# Patient Record
Sex: FEMALE | Race: ASIAN | NOT HISPANIC OR LATINO | Employment: FULL TIME | URBAN - METROPOLITAN AREA
[De-identification: names, ages, dates, MRNs, and addresses within clinical notes are randomized per-mention and may not be internally consistent; named-entity substitution may affect disease eponyms.]

---

## 2024-04-08 ENCOUNTER — OFFICE VISIT (OUTPATIENT)
Dept: INTERNAL MEDICINE CLINIC | Facility: CLINIC | Age: 44
End: 2024-04-08
Payer: COMMERCIAL

## 2024-04-08 VITALS
HEART RATE: 71 BPM | TEMPERATURE: 97.7 F | BODY MASS INDEX: 22.38 KG/M2 | DIASTOLIC BLOOD PRESSURE: 70 MMHG | RESPIRATION RATE: 16 BRPM | SYSTOLIC BLOOD PRESSURE: 120 MMHG | HEIGHT: 60 IN | OXYGEN SATURATION: 98 % | WEIGHT: 114 LBS

## 2024-04-08 DIAGNOSIS — R20.0 NUMBNESS AND TINGLING IN BOTH HANDS: ICD-10-CM

## 2024-04-08 DIAGNOSIS — M79.645 BILATERAL THUMB PAIN: ICD-10-CM

## 2024-04-08 DIAGNOSIS — M79.644 BILATERAL THUMB PAIN: ICD-10-CM

## 2024-04-08 DIAGNOSIS — Z13.220 SCREENING CHOLESTEROL LEVEL: ICD-10-CM

## 2024-04-08 DIAGNOSIS — R20.2 NUMBNESS AND TINGLING IN BOTH HANDS: ICD-10-CM

## 2024-04-08 DIAGNOSIS — Z00.00 VISIT FOR PREVENTIVE HEALTH EXAMINATION: Primary | ICD-10-CM

## 2024-04-08 DIAGNOSIS — Z13.1 SCREENING FOR DIABETES MELLITUS: ICD-10-CM

## 2024-04-08 PROCEDURE — 99396 PREV VISIT EST AGE 40-64: CPT | Performed by: INTERNAL MEDICINE

## 2024-04-08 PROCEDURE — 99213 OFFICE O/P EST LOW 20 MIN: CPT | Performed by: INTERNAL MEDICINE

## 2024-04-08 RX ORDER — CHOLECALCIFEROL (VITAMIN D3) 125 MCG
500 CAPSULE ORAL DAILY
COMMUNITY

## 2024-04-08 RX ORDER — MULTIVITAMIN
1 TABLET ORAL DAILY
COMMUNITY

## 2024-04-08 RX ORDER — NAPROXEN 500 MG/1
500 TABLET ORAL 2 TIMES DAILY WITH MEALS
Qty: 20 TABLET | Refills: 0 | Status: SHIPPED | OUTPATIENT
Start: 2024-04-08

## 2024-04-08 NOTE — PATIENT INSTRUCTIONS
Patient was advised to continue present medications. discussed with the patient medications and laboratory data in detail.  Follow-up with me in 3 months or as advised.  If any blood test was ordered please do 1 week prior to next appointment unless advise to get earlier.  If you have any questions please call the office [phone]

## 2024-04-08 NOTE — PROGRESS NOTES
Assessment/Plan:    1. Visit for preventive health examination  -     CBC and differential; Future  -     Comprehensive metabolic panel; Future  -     Lipid panel; Future  -     Hemoglobin A1C; Future  -     Vitamin D 25 hydroxy; Future  -     TSH, 3rd generation; Future    2. Numbness and tingling in both hands  -     CBC and differential; Future  -     Comprehensive metabolic panel; Future  -     Hemoglobin A1C; Future  -     Vitamin B12; Future  -     Folate; Future  -     Vitamin D 25 hydroxy; Future  -     TSH, 3rd generation; Future    3. BMI 22.0-22.9, adult    4. Bilateral thumb pain  -     CBC and differential; Future  -     Vitamin D 25 hydroxy; Future  -     TSH, 3rd generation; Future  -     naproxen (NAPROSYN) 500 mg tablet; Take 1 tablet (500 mg total) by mouth 2 (two) times a day with meals    5. Screening for diabetes mellitus  -     Hemoglobin A1C; Future    6. Screening cholesterol level  -     Lipid panel; Future       Discussion/summary/plan:    Patient states she that she was seen by a GYN and had GYN examination and Pap smear as well as mammogram while she was in [facility] January 2024.  She is having a bilateral thumb pain dorsal aspect possible tendinitis versus early onset arthritis.  She has been applying topical diclofenac cream as needed.  Discussed with the patient will try naproxen 5 mg p.o. twice daily PC for 10 days.  If not better will refer to orthopedic specialist.  She is also having tingling numbness of the both hands worse at nighttime.  Rule out carpal tunnel syndrome versus any metabolic problem.  Patient states she has a history of vitamin B12 deficiency and she takes vitamin B12 500 mcg once a day.  Will check vitamin  B12 levels and other workup to rule out any other metabolic problem causing her tingling numbness of the hands.  If metabolic workup unremarkable will need EMG nerve conduction study.  In the office her Tinel and Phalen sign was negative.    Subjective: Patient  presents for preventive health examination as well as to establish primary care physician.      Patient ID: Alex Christianson is a 43 y.o. female.    HPI  43-year-old female patient first time came to see me for preventive health examination as well as to establish primary care physician.  She recently came to [place].  Denies any chest pain, shortness of breath, pain in abdomen.  Denies any cough, fever, chills.  Denies any nausea vomiting diarrhea.  Complain of bilateral thumb pain as well as tingling numbness of the both hands worse at night.  She has been applying topically diclofenac cream to her thumbs.  Does not recall any fall or injury.  At work she does require repetative movements of fingers and thumbs.    The following portions of the patient's history were reviewed and updated as appropriate:     Past Medical History:  She has a past medical history of Bilateral thumb pain (04/08/2024), BMI 22.0-22.9, adult (04/08/2024), Numbness and tingling in both hands (04/08/2024), and Visit for preventive health examination (04/08/2024).,  _______________________________________________________________________  Past Surgical History:   has no past surgical history on file.,  _______________________________________________________________________  Family History:  family history is not on file.,  _______________________________________________________________________  Social History:   reports that she has never smoked. She has never used smokeless tobacco. She reports that she does not currently use alcohol. She reports that she does not use drugs.,  _______________________________________________________________________  Allergies:  has No Known Allergies..  _______________________________________________________________________  Current Outpatient Medications   Medication Sig Dispense Refill    Multiple Vitamin (multivitamin) tablet Take 1 tablet by mouth daily      naproxen (NAPROSYN) 500 mg tablet Take 1  "tablet (500 mg total) by mouth 2 (two) times a day with meals 20 tablet 0    vitamin B-12 (VITAMIN B-12) 500 mcg tablet Take 500 mcg by mouth daily       No current facility-administered medications for this visit.     _______________________________________________________________________  Review of Systems   Constitutional:  Negative for chills and fever.   HENT:  Negative for congestion, ear pain, hearing loss, nosebleeds, sinus pain, sore throat and trouble swallowing.    Eyes:  Negative for discharge, redness and visual disturbance.   Respiratory:  Negative for cough, chest tightness and shortness of breath.    Cardiovascular:  Negative for chest pain and palpitations.   Gastrointestinal:  Negative for abdominal pain, blood in stool, constipation, diarrhea, nausea and vomiting.   Genitourinary:  Negative for dysuria, flank pain and hematuria.   Musculoskeletal:  Positive for arthralgias (Pain in both thumbs.). Negative for myalgias and neck pain.   Skin:  Negative for color change and rash.   Neurological:  Positive for numbness (Tingling and numbness of both hands). Negative for dizziness, speech difficulty, weakness and headaches.   Hematological:  Does not bruise/bleed easily.   Psychiatric/Behavioral:  Negative for agitation and behavioral problems.            Objective:  Vitals:    04/08/24 1125   BP: 120/70   BP Location: Left arm   Patient Position: Sitting   Cuff Size: Standard   Pulse: 71   Resp: 16   Temp: 97.7 °F (36.5 °C)   TempSrc: Temporal   SpO2: 98%   Weight: 51.7 kg (114 lb)   Height: 4' 11.75\" (1.518 m)     Body mass index is 22.45 kg/m².     Physical Exam  Vitals and nursing note reviewed.   Constitutional:       General: She is not in acute distress.     Appearance: Normal appearance.   HENT:      Head: Normocephalic and atraumatic.      Right Ear: Tympanic membrane, ear canal and external ear normal.      Left Ear: Tympanic membrane, ear canal and external ear normal.      Nose: Nose " normal.      Mouth/Throat:      Mouth: Mucous membranes are moist.      Pharynx: Oropharynx is clear.   Eyes:      General: No scleral icterus.        Right eye: No discharge.         Left eye: No discharge.      Extraocular Movements: Extraocular movements intact.      Conjunctiva/sclera: Conjunctivae normal.      Pupils: Pupils are equal, round, and reactive to light.   Cardiovascular:      Rate and Rhythm: Normal rate and regular rhythm.      Pulses: Normal pulses.      Heart sounds: Normal heart sounds. No murmur heard.  Pulmonary:      Effort: Pulmonary effort is normal. No respiratory distress.      Breath sounds: Normal breath sounds. No wheezing or rales.   Abdominal:      General: Bowel sounds are normal. There is no distension.      Palpations: Abdomen is soft.      Tenderness: There is no abdominal tenderness.   Musculoskeletal:         General: Tenderness (Tender on the dorsal aspect of the both thumbs.  Has a good range of motion of the joints.  No gross swelling.) present. Normal range of motion.      Cervical back: Normal range of motion and neck supple. No muscular tenderness.      Right lower leg: No edema.      Left lower leg: No edema.      Comments: Tinel and Phalen sign negative at present.   Skin:     General: Skin is warm.      Findings: No rash.   Neurological:      General: No focal deficit present.      Mental Status: She is alert and oriented to person, place, and time.      Motor: No weakness.      Coordination: Coordination normal.      Gait: Gait normal.   Psychiatric:         Mood and Affect: Mood normal.         Behavior: Behavior normal.

## 2024-04-10 LAB
25(OH)D3+25(OH)D2 SERPL-MCNC: 26 NG/ML (ref 30–100)
ALBUMIN SERPL-MCNC: 4.1 G/DL (ref 3.5–5.7)
ALP SERPL-CCNC: 31 U/L (ref 35–120)
ALT SERPL-CCNC: 10 U/L
ANION GAP SERPL CALCULATED.3IONS-SCNC: 10 MMOL/L (ref 3–11)
AST SERPL-CCNC: 13 U/L
BASOPHILS # BLD AUTO: 0 THOU/CMM (ref 0–0.1)
BASOPHILS NFR BLD AUTO: 0 %
BILIRUB SERPL-MCNC: 0.9 MG/DL (ref 0.2–1)
BUN SERPL-MCNC: 6 MG/DL (ref 7–25)
CALCIUM SERPL-MCNC: 9.1 MG/DL (ref 8.5–10.1)
CHLORIDE SERPL-SCNC: 102 MMOL/L (ref 100–109)
CHOLEST SERPL-MCNC: 212 MG/DL
CHOLEST/HDLC SERPL: 4.9 {RATIO}
CO2 SERPL-SCNC: 25 MMOL/L (ref 21–31)
CREAT SERPL-MCNC: 0.56 MG/DL (ref 0.4–1.1)
CYTOLOGY CMNT CVX/VAG CYTO-IMP: ABNORMAL
DIFFERENTIAL METHOD BLD: ABNORMAL
EOSINOPHIL # BLD AUTO: 0.1 THOU/CMM (ref 0–0.5)
EOSINOPHIL NFR BLD AUTO: 1 %
ERYTHROCYTE [DISTWIDTH] IN BLOOD BY AUTOMATED COUNT: 12.9 % (ref 12–16)
EST. AVERAGE GLUCOSE BLD GHB EST-MCNC: 103 MG/DL
FOLATE SERPL-MCNC: >22.3 NG/ML
GFR/BSA.PRED SERPLBLD CYS-BASED-ARV: 115 ML/MIN/{1.73_M2}
GLUCOSE SERPL-MCNC: 79 MG/DL (ref 65–99)
HBA1C MFR BLD: 5.2 %
HCT VFR BLD AUTO: 38.3 % (ref 35–43)
HDLC SERPL-MCNC: 43 MG/DL (ref 23–92)
HGB BLD-MCNC: 13.3 G/DL (ref 11.5–14.5)
LDLC SERPL CALC-MCNC: 132 MG/DL
LYMPHOCYTES # BLD AUTO: 4.5 THOU/CMM (ref 1–3)
LYMPHOCYTES NFR BLD AUTO: 47 %
MCH RBC QN AUTO: 30.9 PG (ref 26–34)
MCHC RBC AUTO-ENTMCNC: 34.7 G/DL (ref 32–37)
MCV RBC AUTO: 89 FL (ref 80–100)
MONOCYTES # BLD AUTO: 0.6 THOU/CMM (ref 0.3–1)
MONOCYTES NFR BLD AUTO: 6 %
NEUTROPHILS # BLD AUTO: 4.4 THOU/CMM (ref 1.8–7.8)
NEUTROPHILS NFR BLD AUTO: 46 %
NONHDLC SERPL-MCNC: 169 MG/DL
PLATELET # BLD AUTO: 301 THOU/CMM (ref 140–350)
PMV BLD REES-ECKER: 8.7 FL (ref 7.5–11.3)
POTASSIUM SERPL-SCNC: 3.9 MMOL/L (ref 3.5–5.2)
PROT SERPL-MCNC: 6.8 G/DL (ref 6.3–8.3)
RBC # BLD AUTO: 4.31 MILL/CMM (ref 3.7–4.7)
SODIUM SERPL-SCNC: 137 MMOL/L (ref 135–145)
TRIGL SERPL-MCNC: 185 MG/DL
TSH SERPL-ACNC: 5.7 UIU/ML (ref 0.45–5.33)
VIT B12 SERPL-MCNC: 1244 PG/ML (ref 180–914)
WBC # BLD AUTO: 9.6 THOU/CMM (ref 4–10)

## 2024-04-25 PROBLEM — E55.9 VITAMIN D DEFICIENCY: Status: ACTIVE | Noted: 2024-04-25

## 2024-05-07 DIAGNOSIS — L73.9 FOLLICULITIS: Primary | ICD-10-CM

## 2024-05-07 RX ORDER — DOXYCYCLINE HYCLATE 100 MG
100 TABLET ORAL 2 TIMES DAILY
Qty: 28 TABLET | Refills: 0 | Status: SHIPPED | OUTPATIENT
Start: 2024-05-07 | End: 2024-05-21

## 2024-07-16 ENCOUNTER — OFFICE VISIT (OUTPATIENT)
Dept: INTERNAL MEDICINE CLINIC | Facility: CLINIC | Age: 44
End: 2024-07-16
Payer: COMMERCIAL

## 2024-07-16 VITALS
SYSTOLIC BLOOD PRESSURE: 110 MMHG | HEART RATE: 83 BPM | BODY MASS INDEX: 21.6 KG/M2 | TEMPERATURE: 97.5 F | HEIGHT: 60 IN | RESPIRATION RATE: 18 BRPM | WEIGHT: 110 LBS | OXYGEN SATURATION: 98 % | DIASTOLIC BLOOD PRESSURE: 70 MMHG

## 2024-07-16 DIAGNOSIS — R20.0 NUMBNESS AND TINGLING IN BOTH HANDS: ICD-10-CM

## 2024-07-16 DIAGNOSIS — E78.2 MIXED HYPERLIPIDEMIA: Primary | ICD-10-CM

## 2024-07-16 DIAGNOSIS — M79.644 BILATERAL THUMB PAIN: ICD-10-CM

## 2024-07-16 DIAGNOSIS — E55.9 VITAMIN D DEFICIENCY: ICD-10-CM

## 2024-07-16 DIAGNOSIS — R79.89 ELEVATED TSH: ICD-10-CM

## 2024-07-16 DIAGNOSIS — R20.2 NUMBNESS AND TINGLING IN BOTH HANDS: ICD-10-CM

## 2024-07-16 DIAGNOSIS — M79.645 BILATERAL THUMB PAIN: ICD-10-CM

## 2024-07-16 PROCEDURE — 99213 OFFICE O/P EST LOW 20 MIN: CPT | Performed by: INTERNAL MEDICINE

## 2024-07-16 RX ORDER — LANOLIN ALCOHOL/MO/W.PET/CERES
1000 CREAM (GRAM) TOPICAL 2 TIMES WEEKLY
COMMUNITY

## 2024-07-16 RX ORDER — MULTIVIT-MIN/IRON/FOLIC ACID/K 18-600-40
1 CAPSULE ORAL
COMMUNITY

## 2024-07-16 NOTE — PROGRESS NOTES
Assessment/Plan:    1. Mixed hyperlipidemia  -     Lipid panel; Future  -     Lipid panel  2. Vitamin D deficiency  -     Vitamin D 25 hydroxy; Future  -     Vitamin D 25 hydroxy  3. Elevated TSH  -     TSH, 3rd generation with Free T4 reflex; Future  -     TSH, 3rd generation with Free T4 reflex  4. BMI 21.0-21.9, adult  5. Bilateral thumb pain  6. Numbness and tingling in both hands     Discussion/summary/plan:    Last cholesterol 212, triglyceride 185, HDL 43,  patient has been watching her diet for cholesterol carbs intake.  Advised for low-cholesterol low sugar low carbs diet.  Will follow lipid panel.  Last vitamin D level was 26 since then she has been taking vitamin D 2000 IU daily.  Will follow vitamin D level.  Last TSH was 5.7.  Will observe for now and repeat TSH with free T4 if persistently elevated or get worse may consider starting medication.  Her vitamin B12 is elevated while she was taking 1000 mcg daily.  Since after blood test dose of vitamin B12 decreased to 1000 program 2 times per week.  Her bilateral thumb pain is better.  Tingling numbness of the hands also better.     Subjective: Patient presents for follow-up.      Patient ID: Alex Christianson is a 43 y.o. female.    HPI  43-year-old female patient presents for follow-up of her medical problems.  She denies chest pain, shortness of breath, pain in abdomen.  Denies any cough, fever, chills.  Denies any nausea vomiting diarrhea.  Patient states her pain in her thumbs as well as tingling numbness of the hands somewhat better.    The following portions of the patient's history were reviewed and updated as appropriate:     Past Medical History:  She has a past medical history of Bilateral thumb pain (04/08/2024), BMI 21.0-21.9, adult (07/16/2024), BMI 22.0-22.9, adult (04/08/2024), Elevated TSH (07/16/2024), Mixed hyperlipidemia (07/16/2024), Numbness and tingling in both hands (04/08/2024), and Visit for preventive health examination  (04/08/2024).,  _______________________________________________________________________  Past Surgical History:   has no past surgical history on file.,  _______________________________________________________________________  Family History:  family history is not on file.,  _______________________________________________________________________  Social History:   reports that she has never smoked. She has never used smokeless tobacco. She reports that she does not currently use alcohol. She reports that she does not use drugs.,  _______________________________________________________________________  Allergies:  has No Known Allergies..  _______________________________________________________________________  Current Outpatient Medications   Medication Sig Dispense Refill   • Multiple Vitamin (multivitamin) tablet Take 1 tablet by mouth daily     • vitamin B-12 (VITAMIN B-12) 1,000 mcg tablet Take 1,000 mcg by mouth 2 (two) times a week     • Vitamin D, Cholecalciferol, 50 MCG (2000 UT) CAPS Take 1 tablet by mouth Daily at 2am       No current facility-administered medications for this visit.     _______________________________________________________________________  Review of Systems   Constitutional:  Negative for chills and fever.   HENT:  Negative for congestion, ear pain, hearing loss, nosebleeds, sinus pain, sore throat and trouble swallowing.    Eyes:  Negative for discharge, redness and visual disturbance.   Respiratory:  Negative for cough, chest tightness and shortness of breath.    Cardiovascular:  Negative for chest pain and palpitations.   Gastrointestinal:  Negative for abdominal pain, blood in stool, constipation, diarrhea, nausea and vomiting.   Genitourinary:  Negative for dysuria, flank pain and hematuria.   Musculoskeletal:  Negative for arthralgias, myalgias and neck pain.   Skin:  Negative for color change and rash.   Neurological:  Negative for dizziness, speech difficulty, weakness and  "headaches.   Hematological:  Does not bruise/bleed easily.   Psychiatric/Behavioral:  Negative for agitation and behavioral problems.            Objective:  Vitals:    07/16/24 1021   BP: 110/70   BP Location: Left arm   Patient Position: Sitting   Cuff Size: Standard   Pulse: 83   Resp: 18   Temp: 97.5 °F (36.4 °C)   TempSrc: Temporal   SpO2: 98%   Weight: 49.9 kg (110 lb)   Height: 4' 11.75\" (1.518 m)     Body mass index is 21.66 kg/m².     Physical Exam  Vitals and nursing note reviewed.   Constitutional:       General: She is not in acute distress.     Appearance: Normal appearance.   HENT:      Head: Normocephalic and atraumatic.      Right Ear: Ear canal and external ear normal.      Left Ear: Ear canal and external ear normal.      Nose: Nose normal.      Mouth/Throat:      Mouth: Mucous membranes are moist.   Eyes:      General: No scleral icterus.        Right eye: No discharge.         Left eye: No discharge.      Extraocular Movements: Extraocular movements intact.      Conjunctiva/sclera: Conjunctivae normal.   Cardiovascular:      Rate and Rhythm: Normal rate and regular rhythm.      Pulses: Normal pulses.      Heart sounds: Normal heart sounds. No murmur heard.  Pulmonary:      Effort: Pulmonary effort is normal. No respiratory distress.      Breath sounds: Normal breath sounds. No wheezing, rhonchi or rales.   Abdominal:      General: Bowel sounds are normal.      Palpations: Abdomen is soft.      Tenderness: There is no abdominal tenderness.   Musculoskeletal:         General: Normal range of motion.      Cervical back: Normal range of motion and neck supple. No muscular tenderness.      Right lower leg: No edema.      Left lower leg: No edema.   Skin:     General: Skin is warm.      Findings: No rash.   Neurological:      General: No focal deficit present.      Mental Status: She is alert and oriented to person, place, and time.      Motor: No weakness.      Coordination: Coordination normal. "   Psychiatric:         Mood and Affect: Mood normal.         Behavior: Behavior normal.

## 2024-07-16 NOTE — PATIENT INSTRUCTIONS
Patient was advised to continue present medications. discussed with the patient medications and laboratory data in detail.  Follow-up with me in 3 months or as advised.  If any blood test was ordered please do 1 week prior to next appointment unless advise to get earlier.  If you have any questions please call the office [phone]

## 2025-01-13 LAB
25(OH)D3+25(OH)D2 SERPL-MCNC: 32 NG/ML (ref 30–100)
TSH SERPL-ACNC: 4 UIU/ML (ref 0.45–5.33)

## 2025-01-14 LAB
CHOLEST SERPL-MCNC: 201 MG/DL
CHOLEST/HDLC SERPL: 4.2 {RATIO}
HDLC SERPL-MCNC: 48 MG/DL (ref 23–92)
LDLC SERPL CALC-MCNC: 125 MG/DL
NONHDLC SERPL-MCNC: 153 MG/DL
TRIGL SERPL-MCNC: 141 MG/DL

## 2025-01-16 ENCOUNTER — OFFICE VISIT (OUTPATIENT)
Dept: INTERNAL MEDICINE CLINIC | Facility: CLINIC | Age: 45
End: 2025-01-16
Payer: COMMERCIAL

## 2025-01-16 VITALS
HEART RATE: 72 BPM | SYSTOLIC BLOOD PRESSURE: 110 MMHG | HEIGHT: 60 IN | WEIGHT: 112 LBS | OXYGEN SATURATION: 99 % | DIASTOLIC BLOOD PRESSURE: 70 MMHG | BODY MASS INDEX: 21.99 KG/M2

## 2025-01-16 DIAGNOSIS — R79.89 ELEVATED TSH: ICD-10-CM

## 2025-01-16 DIAGNOSIS — Z12.31 ENCOUNTER FOR SCREENING MAMMOGRAM FOR BREAST CANCER: ICD-10-CM

## 2025-01-16 DIAGNOSIS — E55.9 VITAMIN D DEFICIENCY: ICD-10-CM

## 2025-01-16 DIAGNOSIS — E78.2 MIXED HYPERLIPIDEMIA: Primary | ICD-10-CM

## 2025-01-16 PROCEDURE — 99213 OFFICE O/P EST LOW 20 MIN: CPT | Performed by: INTERNAL MEDICINE

## 2025-01-16 NOTE — PROGRESS NOTES
Name: Alex Christianson      : 1980      MRN: [id number]  Encounter Provider: Kayley Pace MD  Encounter Date: 2025   Encounter department: [facility] INTERNAL MEDICINE  :  Assessment & Plan  Mixed hyperlipidemia  Cholesterol decreased from 2 12-2 01, triglyceride decreased from 1 85-1 41, HDL 4048, LDL decreased from 1 32-1 25 so advised to continue low-cholesterol, low sugar low carbs diet.       Elevated TSH  She had elevated TSH 5.7 repeat TSH normal 4.0.       Vitamin D deficiency  Vitamin D deficient resolved on vitamin D supplement.  Vitamin D level decreased from 26-32 advised to continue vitamin D supplement daily 2000 IU.       BMI 22.0-22.9, adult         Encounter for screening mammogram for breast cancer    Orders:  •  Mammo screening bilateral w 3d and cad; Future           History of Present Illness     HPI  44-year-old female patient presents for follow-up of her medical problems.      Current Outpatient Medications:   •  Multiple Vitamin (multivitamin) tablet, Take 1 tablet by mouth daily, Disp: , Rfl:   •  vitamin B-12 (VITAMIN B-12) 1,000 mcg tablet, Take 1,000 mcg by mouth 2 (two) times a week, Disp: , Rfl:   •  Vitamin D, Cholecalciferol, 50 MCG (2000 UT) CAPS, Take 1 tablet by mouth Daily at 2am, Disp: , Rfl:      Past Medical History:   Diagnosis Date   • Bilateral thumb pain 2024   • BMI 21.0-21.9, adult 2024   • BMI 22.0-22.9, adult 2024   • Elevated TSH 2024   • Mixed hyperlipidemia 2024   • Numbness and tingling in both hands 2024   • Visit for preventive health examination 2024      Review of Systems   Constitutional:  Negative for chills, fatigue and fever.   HENT:  Negative for congestion, ear pain, hearing loss, nosebleeds, sinus pain, sore throat and trouble swallowing.    Eyes:  Negative for discharge, redness and visual disturbance.   Respiratory:  Negative for cough, chest tightness and  "shortness of breath.    Cardiovascular:  Negative for chest pain and palpitations.   Gastrointestinal:  Negative for abdominal pain, blood in stool, constipation, diarrhea, nausea and vomiting.   Genitourinary:  Negative for dysuria, flank pain, frequency and hematuria.   Musculoskeletal:  Negative for arthralgias, myalgias and neck pain.   Skin:  Negative for color change and rash.   Neurological:  Negative for dizziness, speech difficulty, weakness and headaches.   Hematological:  Does not bruise/bleed easily.   Psychiatric/Behavioral:  Negative for agitation and behavioral problems.          Objective   /70   Pulse 72   Ht 4' 11.75\" (1.518 m)   Wt 50.8 kg (112 lb)   SpO2 99%   BMI 22.06 kg/m²      Physical Exam  Vitals and nursing note reviewed.   Constitutional:       General: She is not in acute distress.     Appearance: Normal appearance. She is well-developed.   HENT:      Head: Normocephalic and atraumatic.      Right Ear: External ear normal.      Left Ear: External ear normal.      Nose: Nose normal.      Mouth/Throat:      Mouth: Mucous membranes are moist.      Pharynx: Oropharynx is clear.   Eyes:      General: No scleral icterus.        Right eye: No discharge.         Left eye: No discharge.      Extraocular Movements: Extraocular movements intact.      Conjunctiva/sclera: Conjunctivae normal.      Pupils: Pupils are equal, round, and reactive to light.   Cardiovascular:      Rate and Rhythm: Normal rate and regular rhythm.      Pulses: Normal pulses.      Heart sounds: Normal heart sounds. No murmur heard.  Pulmonary:      Effort: Pulmonary effort is normal. No respiratory distress.      Breath sounds: Normal breath sounds. No wheezing, rhonchi or rales.   Abdominal:      General: Bowel sounds are normal. There is no distension.      Palpations: Abdomen is soft.      Tenderness: There is no abdominal tenderness.   Musculoskeletal:         General: No swelling. Normal range of motion.      " Cervical back: Normal range of motion and neck supple.      Right lower leg: No edema.      Left lower leg: No edema.   Skin:     General: Skin is warm and dry.      Capillary Refill: Capillary refill takes less than 2 seconds.      Findings: No rash.   Neurological:      General: No focal deficit present.      Mental Status: She is alert and oriented to person, place, and time.      Motor: No weakness.      Coordination: Coordination normal.   Psychiatric:         Mood and Affect: Mood normal.         Behavior: Behavior normal.

## 2025-01-16 NOTE — PATIENT INSTRUCTIONS
Patient was advised to continue present medications. discussed with the patient medications and laboratory data in detail.  Follow-up with me in 12 months or as advised.  If any blood test was ordered please do 1 week prior to next appointment unless advise to get earlier.  If you have any questions please call the office [phone]

## 2025-01-16 NOTE — ASSESSMENT & PLAN NOTE
Vitamin D deficient resolved on vitamin D supplement.  Vitamin D level decreased from 26-32 advised to continue vitamin D supplement daily 2000 IU.

## 2025-01-16 NOTE — ASSESSMENT & PLAN NOTE
Cholesterol decreased from 2 12-2 01, triglyceride decreased from 1 85-1 41, HDL 4048, LDL decreased from 1 32-1 25 so advised to continue low-cholesterol, low sugar low carbs diet.

## 2025-02-07 ENCOUNTER — TELEPHONE (OUTPATIENT)
Dept: INTERNAL MEDICINE CLINIC | Facility: CLINIC | Age: 45
End: 2025-02-07

## 2025-02-07 NOTE — TELEPHONE ENCOUNTER
Patient will need additional right breast ultrasound to better define asymmetry noted in screening mammogram.  This should have been facilitated by [facility] breast center.

## 2025-02-07 NOTE — TELEPHONE ENCOUNTER
Received faxed Mammogram results - needs follow up. Dr Pace out of office.    See Care Everywhere or media attached to this phone msg      MAMMOGRAM FINDINGS:  A minimum of two views were obtained. 3D tomosynthesis was performed.  Computer-aided detection was utilized by the radiologist in the  interpretation of this examination.    There is a focal asymmetry seen in the medial region of the right breast  located 4 centimeters from the nipple.    There are calcifications in the right breast 4 cm from the nipple.    There are no suspicious findings within the contralateral breast.  Incidental skin calcifications are present on the left.     The breasts are heterogeneously dense, which may obscure small masses.  Exam End: 02/05/25  9:26 AM Last Resulted: 02/06/25  2:42 PM   Received From: [facility]  Result Received: 02/07/25  2:25 PM

## 2025-02-10 ENCOUNTER — TELEPHONE (OUTPATIENT)
Dept: INTERNAL MEDICINE CLINIC | Facility: CLINIC | Age: 45
End: 2025-02-10

## 2025-02-10 DIAGNOSIS — R92.8 ABNORMAL MAMMOGRAM: Primary | ICD-10-CM

## 2025-02-10 NOTE — TELEPHONE ENCOUNTER
Patient called the RX Refill Line. Message is being forwarded to the office.     Patient is requesting an order for an ultrasound of her right breast, said that it was needed after having the mammogram done. Pt goes to [facility], their phone number is     Please contact patient at , pt would like a call back on this number once script is sent

## 2025-02-10 NOTE — TELEPHONE ENCOUNTER
LVM we cannot email results. The [place] office is currently closed while Dr. Pace is away, except for Wednesday from 1-5:30 if they want to  the results. We can also mail them or I can make arrangements for the results to be picked up at one of our other offices.

## 2025-02-10 NOTE — TELEPHONE ENCOUNTER
Patient called the RX Refill Line. Message is being forwarded to the office.     Patient is requesting a script for US of the R breast. State the mammogram  results state that an ultra sound is needed and an appointment cannot be made because [facility] state a prescription is needed from the doctor    Patient advised I will a message to the office, patient asked when would this be completed. Patient advised I will send a high priority message for someone to call her. Patient state she would prefer a call back at [phone] because the phone number on file is not accessible    Please contact patient at the ph number above to let her know when the US script of R breast is complete so  she can schedule the appointment

## 2025-02-10 NOTE — TELEPHONE ENCOUNTER
Patient called rx refill line inquiring on the documentation provided within the encounter. Patient would like the mammogram results sent to email: [email or web address]. Once sent, please contact to ensure email was successfully received. Advised to contact [facility]. Understanding verbalized.

## 2025-02-10 NOTE — TELEPHONE ENCOUNTER
Patient spouse advised per communication consent. Advised to contact [facility] breast center to scheduled US. He verbalized understanding and will follow up.

## 2025-02-12 ENCOUNTER — TELEPHONE (OUTPATIENT)
Dept: INTERNAL MEDICINE CLINIC | Facility: CLINIC | Age: 45
End: 2025-02-12

## 2025-02-12 NOTE — TELEPHONE ENCOUNTER
Patient called to check the status of a request for a breast US order. Patient was informed that a order was faxed to [facility]. Patient verbalized understanding

## 2025-02-13 NOTE — TELEPHONE ENCOUNTER
"Alex    Patient is asking for a call back to discuss his Mammogram issues.    Please call\" [phone]  "

## 2025-02-13 NOTE — TELEPHONE ENCOUNTER
I spoke with patient's spouse per communication consent. I was able to explain how the process works and the diagnostic mammogram is a different test than the screening mammogram. Patient states that he now understands and is agreeable to completing diagnostic mammogram and US if needed.

## 2025-02-13 NOTE — TELEPHONE ENCOUNTER
Nicole from [facility] called back - Dr Ludwig original screening order had the automatic order to do additional testing.  So they really did not need anything.    Pt's spouse was refusing the diagnostic mammogram - he says because ins won't pay for 2.  Nicole tried to explain the difference to him but he kept cutting her off.  He only wanted these ordered by Dr Pace or Dr Hammer.      So just to appease her son can you have Dr Hammer order a Diagnostic Rt Mammogram with 3D & CAD?  They really do need to do this because calcifications do not show up on US's    As of now she is scheduled for both tomorrow @ 7:15 am    And please faxed it to her.  [phone]

## 2025-02-13 NOTE — TELEPHONE ENCOUNTER
Clarified with [facility] breast center that patient is agreeable to diagnostic mammogram. No further action needed at this time.

## 2025-02-13 NOTE — TELEPHONE ENCOUNTER
Pts  was transferred to us from imaging wanting to speak with someone at the office. Warm transfer to the office.

## 2025-02-13 NOTE — TELEPHONE ENCOUNTER
Multiple duplicate encounters opened for this issue.     PLE[facility] TRANSF[facility] TO [facility]     LVM at [facility] to have someone call back to clarify the issue or if additional orders are needed. [phone]

## 2025-02-14 ENCOUNTER — RESULTS FOLLOW-UP (OUTPATIENT)
Age: 45
End: 2025-02-14

## 2025-02-14 NOTE — TELEPHONE ENCOUNTER
Patient called in about recommendations on mammogram.  All questions answered at this time.  She will follow up in 6 months.

## 2025-04-29 DIAGNOSIS — Z02.89 PHYSICAL EXAMINATION OF EMPLOYEE: Primary | ICD-10-CM

## 2025-05-05 LAB
GAMMA INTERFERON BACKGROUND BLD IA-ACNC: 0.06 IU/ML
M TB IFN-G BLD-IMP: NEGATIVE
M TB IFN-G CD4+ BCKGRND COR BLD-ACNC: 0 IU/ML
M TB IFN-G CD4+ BCKGRND COR BLD-ACNC: 0.04 IU/ML
MITOGEN IGNF BLD-ACNC: 7.26 IU/ML
QUANTIFERON INCUBATION COMMENT: NORMAL

## 2025-06-14 ENCOUNTER — OFFICE VISIT (OUTPATIENT)
Dept: URGENT CARE | Facility: MEDICAL CENTER | Age: 45
End: 2025-06-14
Payer: COMMERCIAL

## 2025-06-14 VITALS
TEMPERATURE: 97.6 F | SYSTOLIC BLOOD PRESSURE: 142 MMHG | OXYGEN SATURATION: 100 % | DIASTOLIC BLOOD PRESSURE: 67 MMHG | HEART RATE: 93 BPM | RESPIRATION RATE: 18 BRPM

## 2025-06-14 DIAGNOSIS — N39.0 COMPLICATED UTI (URINARY TRACT INFECTION): Primary | ICD-10-CM

## 2025-06-14 DIAGNOSIS — R10.9 ACUTE LEFT FLANK PAIN: ICD-10-CM

## 2025-06-14 LAB
SL AMB  POCT GLUCOSE, UA: ABNORMAL
SL AMB LEUKOCYTE ESTERASE,UA: ABNORMAL
SL AMB POCT BILIRUBIN,UA: ABNORMAL
SL AMB POCT BLOOD,UA: ABNORMAL
SL AMB POCT CLARITY,UA: CLEAR
SL AMB POCT COLOR,UA: YELLOW
SL AMB POCT KETONES,UA: ABNORMAL
SL AMB POCT NITRITE,UA: ABNORMAL
SL AMB POCT PH,UA: 6.5
SL AMB POCT SPECIFIC GRAVITY,UA: 1
SL AMB POCT URINE PROTEIN: ABNORMAL
SL AMB POCT UROBILINOGEN: 0.2

## 2025-06-14 PROCEDURE — 99213 OFFICE O/P EST LOW 20 MIN: CPT | Performed by: FAMILY MEDICINE

## 2025-06-14 PROCEDURE — 81002 URINALYSIS NONAUTO W/O SCOPE: CPT | Performed by: FAMILY MEDICINE

## 2025-06-14 PROCEDURE — 96372 THER/PROPH/DIAG INJ SC/IM: CPT | Performed by: FAMILY MEDICINE

## 2025-06-14 PROCEDURE — 87086 URINE CULTURE/COLONY COUNT: CPT | Performed by: FAMILY MEDICINE

## 2025-06-14 RX ORDER — CIPROFLOXACIN 500 MG/1
500 TABLET, FILM COATED ORAL EVERY 12 HOURS SCHEDULED
Qty: 14 TABLET | Refills: 0 | Status: SHIPPED | OUTPATIENT
Start: 2025-06-14 | End: 2025-06-21

## 2025-06-14 RX ORDER — KETOROLAC TROMETHAMINE 30 MG/ML
30 INJECTION, SOLUTION INTRAMUSCULAR; INTRAVENOUS ONCE
Status: COMPLETED | OUTPATIENT
Start: 2025-06-14 | End: 2025-06-14

## 2025-06-14 RX ORDER — IBUPROFEN 800 MG/1
800 TABLET, FILM COATED ORAL EVERY 8 HOURS PRN
Qty: 30 TABLET | Refills: 0 | Status: SHIPPED | OUTPATIENT
Start: 2025-06-14

## 2025-06-14 RX ADMIN — KETOROLAC TROMETHAMINE 30 MG: 30 INJECTION, SOLUTION INTRAMUSCULAR; INTRAVENOUS at 14:34

## 2025-06-14 NOTE — PROGRESS NOTES
[facility]  Name: Alex Christianson      : 1980      MRN: [id number]  Encounter Provider: Deb Dao MD  Encounter Date: 2025   Encounter department: [facility] [place]  :  Assessment & Plan  Complicated UTI (urinary tract infection)  POCT Urine dip: + leuk and + blood  Urine Culture pending    Suspect left renal stone with complicated UTI  Inj Toradol 30 mg X 1  Advised pt to go to ER for further evaluation, but prefers to wait to see PCP  Start Ciprofloxacin 500 mg bid for 7 days  May take Ibuprofen 800 mg tid for pain    Orders:  •  ciprofloxacin (CIPRO) 500 mg tablet; Take 1 tablet (500 mg total) by mouth every 12 (twelve) hours for 7 days    Acute left flank pain  + renal colic  Orders:  •  POCT urine dip  •  ketorolac (TORADOL) injection 30 mg  •  ibuprofen (MOTRIN) 800 mg tablet; Take 1 tablet (800 mg total) by mouth every 8 (eight) hours as needed for mild pain  •  Urine culture; Future      Patient Instructions  Increase water intake  Follow up with PCP in 1-2 days.  Proceed to  ER if symptoms worsen.    If tests are performed, our office will contact you with results only if changes need to made to the care plan discussed with you at the visit. [facility]    Chief Complaint:   Chief Complaint   Patient presents with   • Shortness of Breath     Family member reports that patient c/o left flank pain closer to the middle back with SOB on deep breaths that just started this morning.      History of Present Illness   HPI    Alex Christianson is a 44 y.o. female  who [facility] today accompanied by her son, Keaton.  Pt states that this morning about 11:00 am she started to have sharp left sided flank pain.  The pain is constantly present, but waxes and wanes.  It hurts to even take a deep breath sometimes.  She does not recall any fall or injury to the area.  NO urinary burning, frequency, fever or  chills, nausea or vomiting    A few months ago she has similar pain, but it resolved on its own.    History obtained from: patient    Review of Systems   Constitutional:  Negative for chills and fever.   HENT:  Negative for congestion, rhinorrhea and sore throat.    Respiratory:  Negative for cough and shortness of breath.    Cardiovascular:  Negative for chest pain.   Gastrointestinal:  Negative for diarrhea, nausea and vomiting.   Genitourinary:  Positive for flank pain. Negative for difficulty urinating, dysuria, frequency, hematuria and urgency.   Skin:  Negative for rash.   Neurological:  Negative for dizziness and headaches.     Past Medical History   Past Medical History[1]  Past Surgical History[2]  Family History[3]  she reports that she has never smoked. She has never used smokeless tobacco. She reports that she does not currently use alcohol. She reports that she does not use drugs.  Current Outpatient Medications   Medication Instructions   • ciprofloxacin (CIPRO) 500 mg, Oral, Every 12 hours scheduled   • ibuprofen (MOTRIN) 800 mg, Oral, Every 8 hours PRN   • Multiple Vitamin (multivitamin) tablet 1 tablet, Oral, Daily   • vitamin B-12 (VITAMIN B-12) 1,000 mcg, Oral, 2 times weekly   • Vitamin D, Cholecalciferol, 50 MCG (2000 UT) CAPS 1 tablet, Oral, Daily  (0200)   Allergies[4]     Objective   /67   Pulse 93   Temp 97.6 °F (36.4 °C)   Resp 18   SpO2 100%      Physical Exam  Vitals and nursing note reviewed.   Constitutional:       Appearance: Normal appearance.   HENT:      Head: Normocephalic and atraumatic.      Mouth/Throat:      Mouth: Mucous membranes are moist.     Cardiovascular:      Rate and Rhythm: Tachycardia present.   Pulmonary:      Effort: Pulmonary effort is normal. No respiratory distress.      Breath sounds: Normal breath sounds. No wheezing, rhonchi or rales.      Comments: Shallow breath sounds due to pain in left flank  Abdominal:      Tenderness: There is no abdominal  "tenderness. There is no right CVA tenderness, left CVA tenderness or guarding.      Comments: Negative CVA tenderness b/l     Musculoskeletal:        Back:       Comments: + paraspinal muscle spasm bilaterally     Neurological:      Mental Status: She is alert and oriented to person, place, and time.     Psychiatric:         Mood and Affect: Mood normal.         Behavior: Behavior normal.         Portions of the record may have been created with voice recognition software.  Occasional wrong word or \"sound a like\" substitutions may have occurred due to the inherent limitations of voice recognition software.  Read the chart carefully and recognize, using context, where substitutions have occurred.         [1]  Past Medical History:  Diagnosis Date   • Bilateral thumb pain 04/08/2024   • BMI 21.0-21.9, adult 07/16/2024   • BMI 22.0-22.9, adult 04/08/2024   • Elevated TSH 07/16/2024   • Mixed hyperlipidemia 07/16/2024   • Numbness and tingling in both hands 04/08/2024   • Visit for preventive health examination 04/08/2024   [2]  No past surgical history on file.[3]  No family history on file.[4]  No Known Allergies"

## 2025-06-14 NOTE — PATIENT INSTRUCTIONS
Patient Education     Flank Pain   General Information   You for flank or side pain. Many things can cause flank pain. Some are serious things like problems with your liver, gall bladder, intestine, or aorta, which is the main blood vessel of your heart. Less serious things like kidney stones, shingles, or muscle problems in your back can also cause flank pain. The doctor feels that the risk of a serious cause for your flank pain is low.  The doctors in the ED may not be able to find all serious causes of flank pain the first time they see you. It is important that you follow up with your doctor. You may be waiting on some test results. The staff will notify you if there are concerning results.  What care is needed at home?   Call your regular doctor to let them know you were in the ED. Make a follow-up appointment if you were told to.  Pay attention to when you have pain to help your doctor learn more about the cause. Write down the foods you eat to see if they may be causing your pain. Also write down what you were doing before and during the pain.  You may want to take medicines like ibuprofen, naproxen, or acetaminophen to help with pain.  Avoid foods or drinks that make your pain worse. Based on the cause of your pain, it might help to avoid or limit:  Drinks that are fizzy or have caffeine.  Fried, greasy, or fatty foods.  Milk or cheese.  Ice and heat may help you ease pain that seems to be caused by a muscle strain or bruise.  Place an ice pack or a bag of frozen vegetables wrapped in a towel over the painful part. Never put ice right on the skin. Do not leave the ice on more than 10 to 15 minutes at a time. Use for the first 24 to 48 hours after an injury.  Use heat after the first 24 to 48 hours, but not right away. Do not use heat with sharp pain or after an acute injury. Heat can make swelling worse. If your doctor tells you to use heat, put a heating pad on your painful part for no more than 20 minutes  at a time. Never go to sleep with a heating pad on as this can cause burns.  When do I need to get emergency help?   Call for an ambulance right away if:   Your flank pain becomes severe, especially if this happens suddenly.  Your flank pain becomes severe and moves down your back or into your belly.  You have trouble breathing or start having chest pain along with your flank pain.  You start throwing up blood or pass a lot of blood in your stool.  Your belly becomes very hard or swollen.  Return to the ED if:   You get a fever of 102.2°F (39°C) or higher or shaking chills.  You have signs of severe fluid loss, such as:  No urine for more than 8 hours.  You feel very light-headed or like you are going to pass out.  You feel weak like you are going to fall.  Your pain gets worse, comes more often, or moves to one area of the belly.  You have an upset stomach or throwing up that isn’t getting better and are having trouble keeping down food and drink.  Your stools are black or tar-colored.  When do I need to call the doctor?   If the pain is not gone or getting better in 1 to 2 days.  You have a fever of 100.4°F (38°C) or higher or chills.  You have pain with passing urine or have blood in your urine.  Your stools have a small amount (less than 1 teaspoon or 5 mL) of blood in them.  You develop early signs of fluid loss, such as:  Your urine is very dark-colored.  Your mouth is dry.  You have muscle cramps.  You have a lack of energy.  You feel light-headed when you get up.  You have new or worsening symptoms.  Last Reviewed Date   2021-04-09  Consumer Information Use and Disclaimer   This generalized information is a limited summary of diagnosis, treatment, and/or medication information. It is not meant to be comprehensive and should be used as a tool to help the user understand and/or assess potential diagnostic and treatment options. It does NOT include all information about conditions, treatments, medications, side  effects, or risks that may apply to a specific patient. It is not intended to be medical advice or a substitute for the medical advice, diagnosis, or treatment of a health care provider based on the health care provider's examination and assessment of a patient’s specific and unique circumstances. Patients must speak with a health care provider for complete information about their health, medical questions, and treatment options, including any risks or benefits regarding use of medications. This information does not endorse any treatments or medications as safe, effective, or approved for treating a specific patient. [employer] and its affiliates disclaim any warranty or liability relating to this information or the use thereof. The use of this information is governed by the Terms of Use, available at https://www.woltersBevSpotuwer.com/en/know/clinical-effectiveness-terms   Copyright   Copyright © 2024 [employer] and its affiliates and/or licensors. All rights reserved.

## 2025-06-16 ENCOUNTER — HOSPITAL ENCOUNTER (OUTPATIENT)
Dept: CT IMAGING | Facility: HOSPITAL | Age: 45
Discharge: HOME | End: 2025-06-16
Attending: INTERNAL MEDICINE
Payer: COMMERCIAL

## 2025-06-16 ENCOUNTER — RESULTS FOLLOW-UP (OUTPATIENT)
Dept: INTERNAL MEDICINE CLINIC | Facility: CLINIC | Age: 45
End: 2025-06-16

## 2025-06-16 DIAGNOSIS — R31.29 MICROSCOPIC HEMATURIA: ICD-10-CM

## 2025-06-16 DIAGNOSIS — R10.9 LEFT FLANK PAIN: Primary | ICD-10-CM

## 2025-06-16 DIAGNOSIS — R10.9 LEFT FLANK PAIN: ICD-10-CM

## 2025-06-16 LAB — BACTERIA UR CULT: NORMAL

## 2025-06-16 PROCEDURE — 74176 CT ABD & PELVIS W/O CONTRAST: CPT
